# Patient Record
Sex: MALE | Race: WHITE | NOT HISPANIC OR LATINO | Employment: FULL TIME | URBAN - METROPOLITAN AREA
[De-identification: names, ages, dates, MRNs, and addresses within clinical notes are randomized per-mention and may not be internally consistent; named-entity substitution may affect disease eponyms.]

---

## 2023-05-15 ENCOUNTER — OFFICE VISIT (OUTPATIENT)
Dept: PRIMARY CARE | Facility: CLINIC | Age: 28
End: 2023-05-15
Payer: COMMERCIAL

## 2023-05-15 VITALS
HEART RATE: 65 BPM | WEIGHT: 170 LBS | OXYGEN SATURATION: 99 % | DIASTOLIC BLOOD PRESSURE: 92 MMHG | SYSTOLIC BLOOD PRESSURE: 138 MMHG | TEMPERATURE: 97.4 F | BODY MASS INDEX: 25.85 KG/M2 | RESPIRATION RATE: 16 BRPM

## 2023-05-15 DIAGNOSIS — R05.9 COUGH IN ADULT: Primary | ICD-10-CM

## 2023-05-15 PROBLEM — F54 PSYCHOLOGICAL FACTOR AFFECTING PHYSICAL CONDITION: Status: ACTIVE | Noted: 2023-05-15

## 2023-05-15 PROBLEM — F98.8 ADD (ATTENTION DEFICIT DISORDER): Status: ACTIVE | Noted: 2023-05-15

## 2023-05-15 PROBLEM — M25.519 SHOULDER PAIN: Status: ACTIVE | Noted: 2023-05-15

## 2023-05-15 PROBLEM — M75.81 TENDINITIS OF RIGHT ROTATOR CUFF: Status: ACTIVE | Noted: 2023-05-15

## 2023-05-15 PROBLEM — J02.0 STREP PHARYNGITIS: Status: ACTIVE | Noted: 2023-05-15

## 2023-05-15 PROBLEM — J02.9 SORE THROAT: Status: ACTIVE | Noted: 2023-05-15

## 2023-05-15 PROBLEM — J06.9 URI, ACUTE: Status: ACTIVE | Noted: 2023-05-15

## 2023-05-15 PROBLEM — D22.9 NEVUS, ATYPICAL: Status: ACTIVE | Noted: 2023-05-15

## 2023-05-15 PROBLEM — J01.10 ACUTE FRONTAL SINUSITIS: Status: ACTIVE | Noted: 2023-05-15

## 2023-05-15 PROBLEM — S06.0XAA CONCUSSION: Status: ACTIVE | Noted: 2023-05-15

## 2023-05-15 PROBLEM — L03.032 PARONYCHIA OF TOE OF LEFT FOOT: Status: ACTIVE | Noted: 2023-05-15

## 2023-05-15 PROBLEM — H66.90 OTITIS MEDIA: Status: ACTIVE | Noted: 2023-05-15

## 2023-05-15 PROBLEM — S80.12XA CONTUSION OF LEFT LOWER EXTREMITY: Status: ACTIVE | Noted: 2023-05-15

## 2023-05-15 PROBLEM — R51.9 FRONTAL HEADACHE: Status: ACTIVE | Noted: 2023-05-15

## 2023-05-15 PROBLEM — K62.5 RECTAL BLEEDING: Status: ACTIVE | Noted: 2023-05-15

## 2023-05-15 PROCEDURE — 99213 OFFICE O/P EST LOW 20 MIN: CPT | Performed by: NURSE PRACTITIONER

## 2023-05-15 PROCEDURE — 1036F TOBACCO NON-USER: CPT | Performed by: NURSE PRACTITIONER

## 2023-05-15 RX ORDER — AZITHROMYCIN 250 MG/1
TABLET, FILM COATED ORAL
Qty: 6 TABLET | Refills: 0 | Status: SHIPPED | OUTPATIENT
Start: 2023-05-15

## 2023-05-15 RX ORDER — ALBUTEROL SULFATE 90 UG/1
2 AEROSOL, METERED RESPIRATORY (INHALATION) EVERY 4 HOURS PRN
Qty: 6.7 G | Refills: 0 | Status: SHIPPED | OUTPATIENT
Start: 2023-05-15 | End: 2024-05-14

## 2023-05-15 ASSESSMENT — ENCOUNTER SYMPTOMS
VOMITING: 0
SHORTNESS OF BREATH: 0
HEADACHES: 0
COUGH: 1
SINUS PRESSURE: 1
SINUS PAIN: 1
MUSCULOSKELETAL NEGATIVE: 1
SWOLLEN GLANDS: 1
ENDOCRINE NEGATIVE: 1
EYES NEGATIVE: 1
PSYCHIATRIC NEGATIVE: 1
NAUSEA: 0
RHINORRHEA: 1
CONSTITUTIONAL NEGATIVE: 1
HOARSE VOICE: 0
CHILLS: 0
NEUROLOGICAL NEGATIVE: 1
GASTROINTESTINAL NEGATIVE: 1
NECK PAIN: 0
CARDIOVASCULAR NEGATIVE: 1
HEMATOLOGIC/LYMPHATIC NEGATIVE: 1
SORE THROAT: 1
DIAPHORESIS: 0
DIARRHEA: 0
ALLERGIC/IMMUNOLOGIC NEGATIVE: 1

## 2023-05-15 NOTE — PROGRESS NOTES
Subjective   Patient ID: Arielle Garcia is a 28 y.o. male who presents for Sinusitis, Cough, and Nasal Congestion.  Patient presents to convenient care appointment with complaint of sinus congestion, pressure, cough, sore throat, headache, ear pressure over 7 days.   Patient has been taking sudafed, expectorant, and mucinex with minimal relief. Patient denies shortness of breath, nausea, vomiting nor diarrhea.  Patient took home covid test last week with negative results.  Patient has been using albuterol inhaler with relief.     Sinusitis  This is a new problem. The current episode started 1 to 4 weeks ago. The problem is unchanged. There has been no fever. Associated symptoms include congestion, coughing, ear pain, sinus pressure, a sore throat and swollen glands. Pertinent negatives include no chills, diaphoresis, headaches, hoarse voice, neck pain, shortness of breath or sneezing. Past treatments include acetaminophen, oral decongestants and nasal decongestants. The treatment provided mild relief.       Review of Systems   Constitutional: Negative.  Negative for chills and diaphoresis.   HENT:  Positive for congestion, ear pain, postnasal drip, rhinorrhea, sinus pressure, sinus pain and sore throat. Negative for hoarse voice and sneezing.    Eyes: Negative.    Respiratory:  Positive for cough. Negative for shortness of breath.    Cardiovascular: Negative.    Gastrointestinal: Negative.  Negative for diarrhea, nausea and vomiting.   Endocrine: Negative.    Genitourinary: Negative.    Musculoskeletal: Negative.  Negative for neck pain.   Skin: Negative.    Allergic/Immunologic: Negative.    Neurological: Negative.  Negative for headaches.   Hematological: Negative.    Psychiatric/Behavioral: Negative.     All other systems reviewed and are negative.      BP (!) 138/92   Pulse 65   Temp 36.3 °C (97.4 °F)   Resp 16   Wt 77.1 kg (170 lb)   SpO2 99%   BMI 25.85 kg/m²     Objective   Physical Exam  Vitals and  nursing note reviewed.   Constitutional:       Appearance: Normal appearance.   HENT:      Head: Normocephalic and atraumatic.      Right Ear: Tympanic membrane, ear canal and external ear normal.      Left Ear: Tympanic membrane, ear canal and external ear normal.      Nose: Congestion present.      Comments: Patient has moderate erythema in bilateral nares and maxilary tenderness.     Mouth/Throat:      Mouth: Mucous membranes are moist.      Pharynx: Oropharynx is clear.   Eyes:      Extraocular Movements: Extraocular movements intact.      Conjunctiva/sclera: Conjunctivae normal.      Pupils: Pupils are equal, round, and reactive to light.   Cardiovascular:      Rate and Rhythm: Normal rate and regular rhythm.      Pulses: Normal pulses.      Heart sounds: Normal heart sounds.   Pulmonary:      Effort: Pulmonary effort is normal. No respiratory distress.      Breath sounds: Normal breath sounds. No stridor. No wheezing, rhonchi or rales.   Chest:      Chest wall: No tenderness.   Musculoskeletal:         General: Normal range of motion.      Cervical back: Normal range of motion and neck supple.   Skin:     General: Skin is warm and dry.      Capillary Refill: Capillary refill takes less than 2 seconds.   Neurological:      General: No focal deficit present.      Mental Status: He is alert and oriented to person, place, and time.   Psychiatric:         Mood and Affect: Mood normal.         Behavior: Behavior normal.         Thought Content: Thought content normal.         Judgment: Judgment normal.         Assessment/Plan   Problem List Items Addressed This Visit    None  Visit Diagnoses       Cough in adult    -  Primary    Relevant Medications    albuterol (Proventil HFA) 90 mcg/actuation inhaler    azithromycin (Zithromax) 250 mg tablet

## 2023-05-15 NOTE — PROGRESS NOTES
Patient's PCP is Callum Juan MD      COLD SYMPTOMS  At home Covid-19 test:  NEGATIVE on Wednesday 05.10.2023   5 days Symptoms:  Runny nose, Congestion, Post Nasal Drainage, Headache, Sinus pressure on forehead, Dry and  Productive Cough--coughing fits, BILATERAL Ear ache pressure, Sore throat--on/off, SOB--without activity, Wheezing, Length of Symptoms: Over 1 Week   Denies: Fever,  Nausea, Diarrhea, Vomiting, Chills, Body aches, Sneezing, Fatigue,    Factors that effect symptoms: Sudafed, Mucinex,     --Related Information--

## 2023-05-15 NOTE — ASSESSMENT & PLAN NOTE
Patient to take medications associated with this visit.  Patient may also take OTC analgesic/antipyretic if needed for pain/fever.  Advised to increase oral fluid intake as tolerated.

## 2025-06-25 DIAGNOSIS — Z13.79 GENETIC SCREENING: ICD-10-CM

## 2025-06-25 PROCEDURE — 36415 COLL VENOUS BLD VENIPUNCTURE: CPT

## 2025-07-10 LAB
COMMENTS - MP RESULT TYPE: NORMAL
SCAN RESULT: NORMAL